# Patient Record
Sex: FEMALE | Race: WHITE | URBAN - NONMETROPOLITAN AREA
[De-identification: names, ages, dates, MRNs, and addresses within clinical notes are randomized per-mention and may not be internally consistent; named-entity substitution may affect disease eponyms.]

---

## 2022-07-28 PROBLEM — R87.619 ABNORMAL PAP SMEAR OF CERVIX: Status: ACTIVE | Noted: 2022-07-28

## 2023-07-31 ENCOUNTER — OFFICE VISIT (OUTPATIENT)
Dept: OBGYN CLINIC | Age: 23
End: 2023-07-31
Payer: COMMERCIAL

## 2023-07-31 VITALS
WEIGHT: 199.6 LBS | SYSTOLIC BLOOD PRESSURE: 118 MMHG | DIASTOLIC BLOOD PRESSURE: 73 MMHG | BODY MASS INDEX: 37.69 KG/M2 | HEIGHT: 61 IN

## 2023-07-31 DIAGNOSIS — Z31.69 ENCOUNTER FOR GENERAL COUNSELING AND ADVICE ON PROCREATION: ICD-10-CM

## 2023-07-31 DIAGNOSIS — Z01.419 WOMEN'S ANNUAL ROUTINE GYNECOLOGICAL EXAMINATION: Primary | ICD-10-CM

## 2023-07-31 PROCEDURE — 99395 PREV VISIT EST AGE 18-39: CPT | Performed by: NURSE PRACTITIONER

## 2023-07-31 ASSESSMENT — ENCOUNTER SYMPTOMS
CONSTIPATION: 0
SHORTNESS OF BREATH: 0
DIARRHEA: 0
ABDOMINAL PAIN: 0

## 2023-07-31 NOTE — PROGRESS NOTES
YEARLY PHYSICAL    Date of service: 2023    Mahamed Claros  Is a 21 y.o. female    PT's PCP is: Justyn Valera PA-C     : 2000                                         Chaperone for Intimate Exam  Chaperone was offered as part of the rooming process. Patient declined and agrees to continue with exam without a chaperone. Chaperone: n/a      Subjective:       Patient's last menstrual period was 2023. Are your menses regular: yes    OB History    Para Term  AB Living   2 2 2     2   SAB IAB Ectopic Molar Multiple Live Births             2      # Outcome Date GA Lbr Carter/2nd Weight Sex Delivery Anes PTL Lv   2 Term 10/04/19 38w0d   F  Local  AVA   1 Term 17 38w0d   F Vag-Spont Local N AVA      Birth Comments: short cervix @ 20wks with bulging membranes      Complications:  Other Excessive Bleeding        Social History     Tobacco Use   Smoking Status Former    Types: Cigarettes    Quit date: 2019    Years since quitting: 3.9   Smokeless Tobacco Never        Social History     Substance and Sexual Activity   Alcohol Use Not Currently       Family History   Problem Relation Age of Onset    Diabetes Paternal Grandmother     Diabetes Maternal Grandmother     Stroke Maternal Grandmother     Alzheimer's Disease Maternal Grandfather     Stroke Maternal Grandfather     Diabetes Mother     Depression Mother     Osteoarthritis Mother     Mental Illness Brother     Asthma Sister     Mental Illness Sister        Any family history of breast or ovarian cancer: No    Any family history of blood clots: No      Allergies: Benzocaine      Current Outpatient Medications:     albuterol sulfate HFA (PROVENTIL;VENTOLIN;PROAIR) 108 (90 Base) MCG/ACT inhaler, Inhale 2 puffs into the lungs, Disp: , Rfl:     naproxen (NAPROSYN) 500 MG tablet, Take 500 mg by mouth 2 times daily as needed, Disp: , Rfl:     acetaminophen